# Patient Record
Sex: MALE | Race: WHITE | NOT HISPANIC OR LATINO | Employment: OTHER | URBAN - METROPOLITAN AREA
[De-identification: names, ages, dates, MRNs, and addresses within clinical notes are randomized per-mention and may not be internally consistent; named-entity substitution may affect disease eponyms.]

---

## 2017-02-17 ENCOUNTER — TRANSCRIBE ORDERS (OUTPATIENT)
Dept: DIABETES SERVICES | Facility: HOSPITAL | Age: 67
End: 2017-02-17

## 2017-02-17 DIAGNOSIS — E11.9 TYPE 2 DIABETES MELLITUS WITHOUT COMPLICATION, WITHOUT LONG-TERM CURRENT USE OF INSULIN (HCC): Primary | ICD-10-CM

## 2017-03-01 ENCOUNTER — APPOINTMENT (OUTPATIENT)
Dept: DIABETES SERVICES | Facility: HOSPITAL | Age: 67
End: 2017-03-01

## 2017-03-01 PROCEDURE — G0109 DIAB MANAGE TRN IND/GROUP: HCPCS

## 2017-03-08 ENCOUNTER — APPOINTMENT (OUTPATIENT)
Dept: DIABETES SERVICES | Facility: HOSPITAL | Age: 67
End: 2017-03-08

## 2017-03-08 PROCEDURE — G0109 DIAB MANAGE TRN IND/GROUP: HCPCS

## 2017-03-15 ENCOUNTER — APPOINTMENT (OUTPATIENT)
Dept: DIABETES SERVICES | Facility: HOSPITAL | Age: 67
End: 2017-03-15

## 2017-03-15 PROCEDURE — G0109 DIAB MANAGE TRN IND/GROUP: HCPCS

## 2017-08-07 ENCOUNTER — TRANSCRIBE ORDERS (OUTPATIENT)
Dept: CARDIOLOGY | Facility: CLINIC | Age: 67
End: 2017-08-07

## 2017-08-07 DIAGNOSIS — R06.09 DYSPNEA ON EXERTION: Primary | ICD-10-CM

## 2017-08-17 ENCOUNTER — HOSPITAL ENCOUNTER (OUTPATIENT)
Dept: CARDIOLOGY | Facility: HOSPITAL | Age: 67
Discharge: HOME | End: 2017-08-17
Attending: FAMILY MEDICINE | Admitting: FAMILY MEDICINE

## 2017-08-17 VITALS
BODY MASS INDEX: 28.04 KG/M2 | DIASTOLIC BLOOD PRESSURE: 68 MMHG | WEIGHT: 207 LBS | SYSTOLIC BLOOD PRESSURE: 112 MMHG | HEART RATE: 70 BPM | HEIGHT: 72 IN

## 2017-08-17 DIAGNOSIS — R06.09 DYSPNEA ON EXERTION: ICD-10-CM

## 2017-08-17 LAB
BH CV ECHO MEAS - ACS: 2.3 CM
BH CV ECHO MEAS - AO MAX PG: 6.2 MMHG
BH CV ECHO MEAS - AO ROOT AREA (BSA CORRECTED): 1.6
BH CV ECHO MEAS - AO ROOT AREA: 9.4 CM^2
BH CV ECHO MEAS - AO ROOT DIAM: 3.5 CM
BH CV ECHO MEAS - AO V2 MAX: 124.9 CM/SEC
BH CV ECHO MEAS - BSA(HAYCOCK): 2.2 M^2
BH CV ECHO MEAS - BSA: 2.2 M^2
BH CV ECHO MEAS - BZI_BMI: 28.1 KILOGRAMS/M^2
BH CV ECHO MEAS - BZI_METRIC_HEIGHT: 182.9 CM
BH CV ECHO MEAS - BZI_METRIC_WEIGHT: 93.9 KG
BH CV ECHO MEAS - CONTRAST EF 4CH: 78.1 ML/M^2
BH CV ECHO MEAS - EDV(MOD-SP4): 73 ML
BH CV ECHO MEAS - EDV(TEICH): 140.9 ML
BH CV ECHO MEAS - EF(CUBED): 84.4 %
BH CV ECHO MEAS - EF(MOD-SP4): 63 %
BH CV ECHO MEAS - EF(TEICH): 77.1 %
BH CV ECHO MEAS - ESV(MOD-SP4): 16 ML
BH CV ECHO MEAS - ESV(TEICH): 32.3 ML
BH CV ECHO MEAS - FS: 46.2 %
BH CV ECHO MEAS - IVS/LVPW: 1.1
BH CV ECHO MEAS - IVSD: 1.2 CM
BH CV ECHO MEAS - LAT PEAK E' VEL: 8 CM/SEC
BH CV ECHO MEAS - LV DIASTOLIC VOL/BSA (35-75): 33.8 ML/M^2
BH CV ECHO MEAS - LV MASS(C)D: 236.1 GRAMS
BH CV ECHO MEAS - LV MASS(C)DI: 109.2 GRAMS/M^2
BH CV ECHO MEAS - LV SYSTOLIC VOL/BSA (12-30): 7.4 ML/M^2
BH CV ECHO MEAS - LVIDD: 5.4 CM
BH CV ECHO MEAS - LVIDS: 2.9 CM
BH CV ECHO MEAS - LVLD AP4: 7.8 CM
BH CV ECHO MEAS - LVLS AP4: 5.9 CM
BH CV ECHO MEAS - LVPWD: 1.1 CM
BH CV ECHO MEAS - MED PEAK E' VEL: 8 CM/SEC
BH CV ECHO MEAS - MV A DUR: 0.13 SEC
BH CV ECHO MEAS - MV A MAX VEL: 111.1 CM/SEC
BH CV ECHO MEAS - MV DEC SLOPE: 316.6 CM/SEC^2
BH CV ECHO MEAS - MV DEC TIME: 0.24 SEC
BH CV ECHO MEAS - MV E MAX VEL: 78.1 CM/SEC
BH CV ECHO MEAS - MV E/A: 0.7
BH CV ECHO MEAS - MV P1/2T MAX VEL: 76.6 CM/SEC
BH CV ECHO MEAS - MV P1/2T: 70.9 MSEC
BH CV ECHO MEAS - MVA P1/2T LCG: 2.9 CM^2
BH CV ECHO MEAS - MVA(P1/2T): 3.1 CM^2
BH CV ECHO MEAS - PULM A REVS DUR: 0.11 SEC
BH CV ECHO MEAS - PULM A REVS VEL: 29.2 CM/SEC
BH CV ECHO MEAS - PULM DIAS VEL: 31.1 CM/SEC
BH CV ECHO MEAS - PULM S/D: 1.8
BH CV ECHO MEAS - PULM SYS VEL: 55.4 CM/SEC
BH CV ECHO MEAS - SI(CUBED): 61.2 ML/M^2
BH CV ECHO MEAS - SI(MOD-SP4): 26.4 ML/M^2
BH CV ECHO MEAS - SI(TEICH): 50.2 ML/M^2
BH CV ECHO MEAS - SV(CUBED): 132.4 ML
BH CV ECHO MEAS - SV(MOD-SP4): 57 ML
BH CV ECHO MEAS - SV(TEICH): 108.6 ML
BH CV ECHO MEAS - TAPSE (>1.6): 2.5 CM2
BH CV STRESS BP STAGE 1: NORMAL
BH CV STRESS BP STAGE 2: NORMAL
BH CV STRESS DURATION MIN STAGE 1: 3
BH CV STRESS DURATION MIN STAGE 2: 3
BH CV STRESS DURATION MIN STAGE 3: 0
BH CV STRESS DURATION SEC STAGE 1: 0
BH CV STRESS DURATION SEC STAGE 2: 0
BH CV STRESS DURATION SEC STAGE 3: 30
BH CV STRESS ECHO POST STRESS EJECTION FRACTION EF: 77 %
BH CV STRESS GRADE STAGE 1: 10
BH CV STRESS GRADE STAGE 2: 12
BH CV STRESS GRADE STAGE 3: 14
BH CV STRESS HR STAGE 1: 107
BH CV STRESS HR STAGE 2: 126
BH CV STRESS HR STAGE 3: 141
BH CV STRESS METS STAGE 1: 5
BH CV STRESS METS STAGE 2: 7.5
BH CV STRESS METS STAGE 3: 10
BH CV STRESS PROTOCOL 1: NORMAL
BH CV STRESS SPEED STAGE 1: 1.7
BH CV STRESS SPEED STAGE 2: 2.5
BH CV STRESS SPEED STAGE 3: 3.4
BH CV STRESS STAGE 1: 1
BH CV STRESS STAGE 2: 2
BH CV STRESS STAGE 3: 3
BH CV XLRA - RV BASE: 3.6 CM
BH CV XLRA - TDI S': 10 CM/SEC
E/E' RATIO: 9
LEFT ATRIUM VOLUME INDEX: 23 ML/M2
LV EF 2D ECHO EST: 63 %
MAXIMAL PREDICTED HEART RATE: 153 BPM
PERCENT MAX PREDICTED HR: 92.16 %
STRESS BASELINE BP: NORMAL MMHG
STRESS BASELINE HR: 70 BPM
STRESS PERCENT HR: 108 %
STRESS POST ESTIMATED WORKLOAD: 7.5 METS
STRESS POST EXERCISE DUR MIN: 6 MIN
STRESS POST EXERCISE DUR SEC: 30 SEC
STRESS POST PEAK BP: NORMAL MMHG
STRESS POST PEAK HR: 141 BPM
STRESS TARGET HR: 130 BPM

## 2017-08-17 PROCEDURE — 93350 STRESS TTE ONLY: CPT | Performed by: INTERNAL MEDICINE

## 2017-08-17 PROCEDURE — 93325 DOPPLER ECHO COLOR FLOW MAPG: CPT

## 2017-08-17 PROCEDURE — 93350 STRESS TTE ONLY: CPT

## 2017-08-17 PROCEDURE — 93018 CV STRESS TEST I&R ONLY: CPT | Performed by: INTERNAL MEDICINE

## 2017-08-17 PROCEDURE — 93017 CV STRESS TEST TRACING ONLY: CPT

## 2017-08-17 PROCEDURE — 93320 DOPPLER ECHO COMPLETE: CPT

## 2017-08-17 PROCEDURE — 93325 DOPPLER ECHO COLOR FLOW MAPG: CPT | Performed by: INTERNAL MEDICINE

## 2017-08-17 PROCEDURE — 93016 CV STRESS TEST SUPVJ ONLY: CPT | Performed by: INTERNAL MEDICINE

## 2017-08-17 PROCEDURE — 93320 DOPPLER ECHO COMPLETE: CPT | Performed by: INTERNAL MEDICINE

## 2021-03-01 ENCOUNTER — TRANSCRIBE ORDERS (OUTPATIENT)
Dept: ADMINISTRATIVE | Facility: HOSPITAL | Age: 71
End: 2021-03-01

## 2021-03-01 ENCOUNTER — HOSPITAL ENCOUNTER (OUTPATIENT)
Dept: CT IMAGING | Facility: HOSPITAL | Age: 71
Discharge: HOME | End: 2021-03-01
Admitting: FAMILY MEDICINE

## 2021-03-01 DIAGNOSIS — R07.9 CHEST PAIN, UNSPECIFIED TYPE: ICD-10-CM

## 2021-03-01 DIAGNOSIS — R06.00 DYSPNEA, UNSPECIFIED TYPE: ICD-10-CM

## 2021-03-01 DIAGNOSIS — R07.9 CHEST PAIN, UNSPECIFIED TYPE: Primary | ICD-10-CM

## 2021-03-01 LAB — CREAT BLDA-MCNC: 1 MG/DL (ref 0.6–1.3)

## 2021-03-01 PROCEDURE — 82565 ASSAY OF CREATININE: CPT

## 2021-03-01 PROCEDURE — 71275 CT ANGIOGRAPHY CHEST: CPT

## 2021-03-01 PROCEDURE — 0 IOPAMIDOL PER 1 ML: Performed by: FAMILY MEDICINE

## 2021-03-01 RX ADMIN — IOPAMIDOL 95 ML: 755 INJECTION, SOLUTION INTRAVENOUS at 12:28

## 2021-03-23 ENCOUNTER — OFFICE VISIT (OUTPATIENT)
Dept: SURGERY | Facility: CLINIC | Age: 71
End: 2021-03-23

## 2021-03-23 VITALS
DIASTOLIC BLOOD PRESSURE: 64 MMHG | HEIGHT: 72 IN | WEIGHT: 204.5 LBS | BODY MASS INDEX: 27.7 KG/M2 | OXYGEN SATURATION: 98 % | HEART RATE: 69 BPM | SYSTOLIC BLOOD PRESSURE: 112 MMHG

## 2021-03-23 DIAGNOSIS — K85.00 IDIOPATHIC ACUTE PANCREATITIS WITHOUT INFECTION OR NECROSIS: Primary | ICD-10-CM

## 2021-03-23 DIAGNOSIS — R93.3 ABNORMAL FINDINGS ON DIAGNOSTIC IMAGING OF OTHER PARTS OF DIGESTIVE TRACT: ICD-10-CM

## 2021-03-23 PROBLEM — K21.9 ACID REFLUX: Status: ACTIVE | Noted: 2021-03-23

## 2021-03-23 PROBLEM — K92.1 BLOOD IN FECES: Status: ACTIVE | Noted: 2021-03-23

## 2021-03-23 PROCEDURE — 99204 OFFICE O/P NEW MOD 45 MIN: CPT | Performed by: SURGERY

## 2021-03-23 RX ORDER — FENOFIBRATE 150 MG/1
CAPSULE ORAL
Status: ON HOLD | COMMUNITY
Start: 2014-11-26 | End: 2021-04-19

## 2021-03-23 RX ORDER — CALCIUM CARBONATE 200(500)MG
1 TABLET,CHEWABLE ORAL DAILY
Status: ON HOLD | COMMUNITY
End: 2021-04-19

## 2021-03-23 RX ORDER — LISINOPRIL AND HYDROCHLOROTHIAZIDE 20; 12.5 MG/1; MG/1
1 TABLET ORAL DAILY
COMMUNITY

## 2021-03-23 RX ORDER — HYDROCODONE BITARTRATE AND ACETAMINOPHEN 7.5; 325 MG/1; MG/1
TABLET ORAL
Status: ON HOLD | COMMUNITY
Start: 2021-03-01 | End: 2021-04-19

## 2021-03-23 NOTE — PROGRESS NOTES
Chief Complaint   Patient presents with   • Abdominal Pain     epi per pain an left side         Patient is a 71 y.o. male referred by Tenzin Roa MD for evaluation epigastric and left-sided pain.  Patient was diagnosed with pancreatitis.  Patient had an ultrasound of the gallbladder to evaluate for gallstones which was negative for gallstones and revealed a 2.9 density in the head of the pancreas.  Patient then underwent a CT scan with pancreatic cuts.  There was no evidence of a pancreatic mass but the patient had lymphadenopathy in the periportal triangle that was worrisome for metastatic versus lymphoma.  The radiologist recommended the patient undergo PET scan for further evaluation lymphadenopathy.  Patient reports he has had any weight loss approximately 10 pounds over the last month.  Patient reports he is having pain especially when trying to eat.  Patient describes the pain as a dull aching sensation that radiates from the epigastric area to the left side.  Patient denies nausea or vomiting fever or chills.  Patient denies jaundice, bradford colored stools or dark urine.  Patient is a smoker but has never been diagnosed with any cancers.  Patient has significant family history of lung cancer and colon cancer.  Patient has had a CT scan of the chest which was negative.  Patient denies any melena, hematochezia or bright red blood per rectum.  Patient denies any family history of ulcerative colitis, Crohn's disease or familial polyposis.  Patient had a colonoscopy in 2014 without evidence of polyps.    Past Medical History:   Diagnosis Date   • Hypertension      Past Surgical History:   Procedure Laterality Date   • COLONOSCOPY     • HIATAL HERNIA REPAIR       Family History   Problem Relation Age of Onset   • Colon cancer Mother    • Lung cancer Mother    • Lung cancer Father    • Lung cancer Brother      Social History     Tobacco Use   • Smoking status: Current Every Day Smoker     Packs/day: 0.50      "Years: 58.00     Pack years: 29.00     Types: Cigarettes     Start date: 1963   • Smokeless tobacco: Never Used   • Tobacco comment: trying   Substance Use Topics   • Alcohol use: Yes   • Drug use: Never     No Known Allergies    Current Outpatient Medications:   •  calcium carbonate (TUMS) 500 MG chewable tablet, Chew 1 tablet Daily., Disp: , Rfl:   •  Fenofibrate (LIPOFEN) 150 MG capsule, Take  by mouth., Disp: , Rfl:   •  Multiple Vitamins-Minerals (CENTAMIN PO), Take  by mouth., Disp: , Rfl:   •  HYDROcodone-acetaminophen (NORCO) 7.5-325 MG per tablet, , Disp: , Rfl:   •  lisinopril-hydrochlorothiazide (PRINZIDE,ZESTORETIC) 20-12.5 MG per tablet, Take 1 tablet by mouth Daily., Disp: , Rfl:     Review of Systems   Constitutional: Negative for activity change, chills, fever and unexpected weight change.   HENT: Negative for congestion.    Eyes: Negative for visual disturbance.   Respiratory: Negative for shortness of breath.    Cardiovascular: Positive for chest pain. Negative for palpitations.   Gastrointestinal: Positive for abdominal pain and constipation. Negative for blood in stool.   Endocrine: Negative for cold intolerance and heat intolerance.   Genitourinary: Negative for hematuria.   Musculoskeletal: Negative for gait problem.   Skin: Negative for color change.   Allergic/Immunologic: Negative for immunocompromised state.   Neurological: Negative for weakness and light-headedness.   Hematological: Negative for adenopathy.   Psychiatric/Behavioral: Negative for sleep disturbance. The patient is not nervous/anxious.           Vitals:    03/23/21 0849   BP: 112/64   BP Location: Left arm   Patient Position: Sitting   Cuff Size: Adult   Pulse: 69   SpO2: 98%   Weight: 92.8 kg (204 lb 8 oz)   Height: 182.9 cm (72\")       Physical Exam  General/physcological:   Alert and oriented x3, in no acute distress  HEENT: Normal cephalic, atraumatic, PERRLA, EOMI, sclera anicteric, moist mucous membranes, neck is " supple, no JVD, no carotid bruits, no thyromegaly no adenopathy  Respiratory: CTA and percussion  CVA: RRR, normal S1-S2, no murmurs, no gallops or rubs  GI: Positive BS, soft, nondistended, nontender, no rebound, no guarding, no hernias, no organomegaly and no masses  Musculoskeletal: Moves all 4 ext, no clubbing, no cyanosis or edema  Neurovascular: Grossly intact  Debilities: none  Emotional behavior: appropriate     Patient does use tobacco products currently.  I have advised patient to stop using tobacco products.  I have reviewed the ultrasound report that revealed a 2.9 cm mass in the pancreas, I have reviewed the CT scan that reveals no pancreatic mass but lymphadenopathy.    Assessment:  Epigastric abdominal pain  Pancreatitis  Lymphadenopathy seen on CT scan    Plan:  I have recommended that the patient undergo further evaluation with an EGD and a colonoscopy.  I have also advised patient I will schedule him a PET scan.  I have discussed the EGD and colonoscopy in detail with the patient.  I have discussed the risks, benefits and alternatives.  I have discussed the risk of anesthesia, bleeding and perforation.  Patient understands these risks, benefits and alternatives and wishes to proceed.  I have him scheduled at his earliest convenience.  I have the PET scan scheduled for 3/26/2021.    Anitha Restrepo MD  General, Minimally Invasive and Endoscopic Surgery  [facility] [place] [id number] [facility] [address] [place] [address]   [address]    [address]  [place], [place] [id number] [place] [id number]    P: [phone]  F: [phone]    Cc:  Tenzin Roa MD

## 2021-03-26 ENCOUNTER — HOSPITAL ENCOUNTER (OUTPATIENT)
Dept: PET IMAGING | Facility: HOSPITAL | Age: 71
Discharge: HOME | End: 2021-03-26
Admitting: SURGERY

## 2021-03-26 ENCOUNTER — HOSPITAL ENCOUNTER (OUTPATIENT)
Dept: PET IMAGING | Facility: HOSPITAL | Age: 71
End: 2021-03-26

## 2021-03-26 DIAGNOSIS — R93.3 ABNORMAL FINDINGS ON DIAGNOSTIC IMAGING OF OTHER PARTS OF DIGESTIVE TRACT: ICD-10-CM

## 2021-03-26 DIAGNOSIS — K85.00 IDIOPATHIC ACUTE PANCREATITIS WITHOUT INFECTION OR NECROSIS: ICD-10-CM

## 2021-03-26 LAB
GLUCOSE BLDC GLUCOMTR-MCNC: 350 MG/DL (ref 70–130)
GLUCOSE BLDC GLUCOMTR-MCNC: 351 MG/DL (ref 70–130)

## 2021-03-26 PROCEDURE — 82962 GLUCOSE BLOOD TEST: CPT

## 2021-04-06 ENCOUNTER — TRANSCRIBE ORDERS (OUTPATIENT)
Dept: LAB | Facility: SURGERY CENTER | Age: 71
End: 2021-04-06

## 2021-04-06 DIAGNOSIS — Z01.818 OTHER SPECIFIED PRE-OPERATIVE EXAMINATION: Primary | ICD-10-CM

## 2021-04-06 PROBLEM — K85.00 IDIOPATHIC ACUTE PANCREATITIS WITHOUT INFECTION OR NECROSIS: Status: ACTIVE | Noted: 2021-04-06

## 2021-04-08 ENCOUNTER — TELEPHONE (OUTPATIENT)
Dept: SURGERY | Facility: CLINIC | Age: 71
End: 2021-04-08

## 2021-04-08 NOTE — TELEPHONE ENCOUNTER
Contacted pt to inform that Dr. Restrepo  Would check on pt sugars. To see where he is.  Could not do PET scan because sugars were 320.  Need to reschedule scan but cannot until sugars are lower.  Pt is aware and PAT at [place] will do day of EGD & C scope 4-19-21

## 2021-04-16 ENCOUNTER — LAB (OUTPATIENT)
Dept: LAB | Facility: SURGERY CENTER | Age: 71
End: 2021-04-16

## 2021-04-16 DIAGNOSIS — Z01.818 OTHER SPECIFIED PRE-OPERATIVE EXAMINATION: ICD-10-CM

## 2021-04-16 LAB — SARS-COV-2 ORF1AB RESP QL NAA+PROBE: NOT DETECTED

## 2021-04-16 PROCEDURE — U0004 COV-19 TEST NON-CDC HGH THRU: HCPCS | Performed by: SURGERY

## 2021-04-16 PROCEDURE — U0005 INFEC AGEN DETEC AMPLI PROBE: HCPCS | Performed by: SURGERY

## 2021-04-16 PROCEDURE — C9803 HOPD COVID-19 SPEC COLLECT: HCPCS

## 2021-04-19 ENCOUNTER — HOSPITAL ENCOUNTER (OUTPATIENT)
Facility: SURGERY CENTER | Age: 71
Setting detail: HOSPITAL OUTPATIENT SURGERY
Discharge: HOME | End: 2021-04-19
Attending: SURGERY | Admitting: SURGERY

## 2021-04-19 ENCOUNTER — ANESTHESIA (OUTPATIENT)
Dept: SURGERY | Facility: SURGERY CENTER | Age: 71
End: 2021-04-19

## 2021-04-19 ENCOUNTER — ANESTHESIA EVENT (OUTPATIENT)
Dept: SURGERY | Facility: SURGERY CENTER | Age: 71
End: 2021-04-19

## 2021-04-19 VITALS
DIASTOLIC BLOOD PRESSURE: 61 MMHG | SYSTOLIC BLOOD PRESSURE: 106 MMHG | HEIGHT: 72 IN | OXYGEN SATURATION: 100 % | TEMPERATURE: 97.7 F | WEIGHT: 192.8 LBS | HEART RATE: 70 BPM | RESPIRATION RATE: 16 BRPM | BODY MASS INDEX: 26.11 KG/M2

## 2021-04-19 DIAGNOSIS — K85.00 IDIOPATHIC ACUTE PANCREATITIS WITHOUT INFECTION OR NECROSIS: ICD-10-CM

## 2021-04-19 LAB — GLUCOSE BLDC GLUCOMTR-MCNC: 159 MG/DL (ref 70–130)

## 2021-04-19 PROCEDURE — 45378 DIAGNOSTIC COLONOSCOPY: CPT | Performed by: SURGERY

## 2021-04-19 PROCEDURE — 0DB68ZX EXCISION OF STOMACH, VIA NATURAL OR ARTIFICIAL OPENING ENDOSCOPIC, DIAGNOSTIC: ICD-10-PCS | Performed by: SURGERY

## 2021-04-19 PROCEDURE — 88305 TISSUE EXAM BY PATHOLOGIST: CPT | Performed by: SURGERY

## 2021-04-19 PROCEDURE — 43239 EGD BIOPSY SINGLE/MULTIPLE: CPT | Performed by: SURGERY

## 2021-04-19 PROCEDURE — 0DB58ZX EXCISION OF ESOPHAGUS, VIA NATURAL OR ARTIFICIAL OPENING ENDOSCOPIC, DIAGNOSTIC: ICD-10-PCS | Performed by: SURGERY

## 2021-04-19 PROCEDURE — 25010000002 PROPOFOL 10 MG/ML EMULSION: Performed by: ANESTHESIOLOGY

## 2021-04-19 RX ORDER — LIDOCAINE HYDROCHLORIDE 20 MG/ML
INJECTION, SOLUTION INFILTRATION; PERINEURAL AS NEEDED
Status: DISCONTINUED | OUTPATIENT
Start: 2021-04-19 | End: 2021-04-19 | Stop reason: SURG

## 2021-04-19 RX ORDER — SODIUM CHLORIDE 0.9 % (FLUSH) 0.9 %
10 SYRINGE (ML) INJECTION AS NEEDED
Status: DISCONTINUED | OUTPATIENT
Start: 2021-04-19 | End: 2021-04-19 | Stop reason: HOSPADM

## 2021-04-19 RX ORDER — PROPOFOL 10 MG/ML
VIAL (ML) INTRAVENOUS AS NEEDED
Status: DISCONTINUED | OUTPATIENT
Start: 2021-04-19 | End: 2021-04-19 | Stop reason: SURG

## 2021-04-19 RX ORDER — SODIUM CHLORIDE, SODIUM LACTATE, POTASSIUM CHLORIDE, CALCIUM CHLORIDE 600; 310; 30; 20 MG/100ML; MG/100ML; MG/100ML; MG/100ML
1000 INJECTION, SOLUTION INTRAVENOUS CONTINUOUS
Status: DISCONTINUED | OUTPATIENT
Start: 2021-04-19 | End: 2021-04-19 | Stop reason: HOSPADM

## 2021-04-19 RX ORDER — LIDOCAINE HYDROCHLORIDE 10 MG/ML
0.5 INJECTION, SOLUTION INFILTRATION; PERINEURAL ONCE AS NEEDED
Status: DISCONTINUED | OUTPATIENT
Start: 2021-04-19 | End: 2021-04-19 | Stop reason: HOSPADM

## 2021-04-19 RX ORDER — MAGNESIUM HYDROXIDE 1200 MG/15ML
LIQUID ORAL AS NEEDED
Status: DISCONTINUED | OUTPATIENT
Start: 2021-04-19 | End: 2021-04-19 | Stop reason: HOSPADM

## 2021-04-19 RX ADMIN — PROPOFOL INJECTABLE EMULSION 200 MCG/KG/MIN: 10 INJECTION, EMULSION INTRAVENOUS at 11:55

## 2021-04-19 RX ADMIN — LIDOCAINE HYDROCHLORIDE 60 MG: 20 INJECTION, SOLUTION INFILTRATION; PERINEURAL at 11:54

## 2021-04-19 RX ADMIN — SODIUM CHLORIDE, POTASSIUM CHLORIDE, SODIUM LACTATE AND CALCIUM CHLORIDE 1000 ML: 600; 310; 30; 20 INJECTION, SOLUTION INTRAVENOUS at 11:23

## 2021-04-19 NOTE — OP NOTE
Operative Note:    Pre-op Diagnosis:   Abdominal pain     Post-op Diagnosis:  Esophagitis and Gastritis     Procedure:  EGD with cold forceps biopsies       Surgeon:  Anitha Restrepo M.D.    Anesthesia:  MAC    EBL:  Minium    Specimen:  GE/Antral Bxs    Complications:  None    Findings:  Esophagitis, Gastritis     Indications:  This is a pleasant 71 y.o. male  that presented with abdominal pain and pancreatitis.    Procedure:     EGD:   After a bite block was placed orally, the patient was sedated and positioned in left lateral decubitus position. The scope was introduced into the posterior pharynx under direct visualization which appeared normal.  Esophagus was intubated under direct visualization and the scope was advanced to the distal GE junction.  There was no abnormalities in the upper or mid esophagus.  The lower esophageal GE junction Z- line was irregular.  Multiple random biopsies were obtained with the cold biopsy forceps. Stomach was entered and insufflated.  The greater and lesser curvature appeared normal.  Scope was advanced into the antrum where the patient had striated gastritis.  Multiple random biopsies were obtained with the cold biopsy forceps and submitted to pathology.  Scope was advanced through the pylorus and into the duodenum to the third portion which all appeared normal.  Scope was slowly withdrawn and retroflexed without evidence of a HH. There were no new findings upon the withdrawal of the scope. The stomach was desufflated.  The procedure was terminated and the patient was transferred to recovery room in stable condition.    Assessment/Plan:  Esophagitis/Gastritis, await pathology results for treatment plan.  I will have the patient call my office on Wednesday for the pathology results.    Anitha Restrepo MD  General, Minimally Invasive and Endoscopic Surgery  [facility]    [address]   [address]    [address]  [place] [address]                [place] [address]    P: [phone]  F: [phone]    Cc:  Tenzin Roa MD

## 2021-04-19 NOTE — ANESTHESIA POSTPROCEDURE EVALUATION
"Patient: Venu Thomas    Procedure Summary     Date: 04/19/21 Room / Location: [facility] OR [facility] / [facility]    Anesthesia Start: 1150 Anesthesia Stop: 1238    Procedures:       COLONOSCOPY (N/A )      ESOPHAGOGASTRODUODENOSCOPY (N/A ) Diagnosis:       Idiopathic acute pancreatitis without infection or necrosis      (Idiopathic acute pancreatitis without infection or necrosis [K85.00])    Surgeons: Anitha Restrepo MD Provider: Isai Gamino MD    Anesthesia Type: MAC ASA Status: 3          Anesthesia Type: MAC    Vitals  Vitals Value Taken Time   /61 04/19/21 1257   Temp 36.5 °C (97.7 °F) 04/19/21 1237   Pulse 70 04/19/21 1257   Resp 16 04/19/21 1257   SpO2 100 % 04/19/21 1257           Post Anesthesia Care and Evaluation    Level of consciousness: awake and alert  Pain management: adequate  Anesthetic complications: No anesthetic complications    Cardiovascular status: acceptable  Respiratory status: acceptable  Hydration status: acceptable    Comments: /61 (BP Location: Left arm, Patient Position: Sitting)   Pulse 70   Temp 36.5 °C (97.7 °F) (Infrared)   Resp 16   Ht 182.9 cm (72\")   Wt 87.5 kg (192 lb 12.8 oz)   SpO2 100%   BMI 26.15 kg/m²         "

## 2021-04-19 NOTE — ANESTHESIA PREPROCEDURE EVALUATION
Anesthesia Evaluation     Patient summary reviewed and Nursing notes reviewed   no history of anesthetic complications:  NPO Solid Status: > 6 hours  NPO Liquid Status: > 6 hours           Airway   Mallampati: II  TM distance: >3 FB  Neck ROM: full  no difficulty expected and No difficulty expected  Dental - normal exam     Pulmonary - normal exam    breath sounds clear to auscultation  (+) a smoker Current,   (-) rhonchi, decreased breath sounds, wheezes, rales, stridor  Cardiovascular - normal exam    NYHA Classification: I  Rhythm: regular  Rate: normal    (+) hypertension, hyperlipidemia,   (-) murmur, weak pulses, friction rub, systolic click, carotid bruits, JVD, peripheral edema      Neuro/Psych- negative ROS  GI/Hepatic/Renal/Endo    (+)  GERD, GI bleeding lower , diabetes mellitus type 2 poorly controlled,     Musculoskeletal (-) negative ROS    Abdominal  - normal exam    Abdomen: soft.   Substance History - negative use     OB/GYN negative ob/gyn ROS         Other - negative ROS                       Anesthesia Plan    ASA 3     MAC     intravenous induction     Anesthetic plan, all risks, benefits, and alternatives have been provided, discussed and informed consent has been obtained with: patient.

## 2021-04-19 NOTE — OP NOTE
Operative Note:    Pre-op dx: abdominal pain     Post-op dx: diverticulosis, hemorrhoids    Procedure: Colonoscopy    Surgeon: Anitha Restrepo MD    Anesthesia: MAC    EBL: none    Specimen:    Order Name Source Comment Collection Info Order Time   TISSUE PATHOLOGY EXAM Gastric, Antrum Antrum bx Collected By: Anitha Restrepo MD 4/19/2021 12:04 PM     Release to patient   Immediate            Complications: none    Procedure:    Colonoscopy:  A digital rectal examination was performed which revealed no rectal masses.  Patient had prolapsed hemorrhoids.  Scope was introduced into the rectum and advanced into the sigmoid, descending colon, splenic flexure, transverse colon, hepatic flexure, ascending colon and into the cecum.  Cecum was identified by the ileocecal valve and appendiceal orifice.  Patient had diverticulosis throughout the sigmoid colon.  There was no evidence of any polyps, masses, AV malformation or inflammation.  The bowel preparation was excellent. The scope was slowly withdrawn and a second look was performed.  Upon the second look, there were no new findings.  The colon was desufflated and the procedure was terminated.   Patient was transferred to recovery room in stable condition.      Assessment/Plan:  Repeat colonoscopy in 10 years.  Diverticulosis and hemorrhoids    Anitha Restrepo MD  General, Minimally Invasive and Endoscopic Surgery  [facility]    [address] [facility] [address] [place] [address]   [address]    [address]  [address] [address]    P: [phone]  F: [phone]    Cc:  Tenzin Roa MD

## 2021-04-19 NOTE — NURSING NOTE
Patient informed that they are high risk for sleep apnea and its clinical implications.  Suggested follow up with Primary Care Physician for possible sleep study.

## 2021-04-20 LAB
LAB AP CASE REPORT: NORMAL
LAB AP CLINICAL INFORMATION: NORMAL
LAB AP DIAGNOSIS COMMENT: NORMAL
PATH REPORT.FINAL DX SPEC: NORMAL
PATH REPORT.GROSS SPEC: NORMAL

## 2021-04-23 ENCOUNTER — HOSPITAL ENCOUNTER (OUTPATIENT)
Dept: PET IMAGING | Facility: HOSPITAL | Age: 71
Discharge: HOME | End: 2021-04-23

## 2021-04-23 ENCOUNTER — APPOINTMENT (OUTPATIENT)
Dept: OTHER | Facility: HOSPITAL | Age: 71
End: 2021-04-23

## 2021-04-23 DIAGNOSIS — Z09 FOLLOW UP: ICD-10-CM

## 2021-04-23 LAB — GLUCOSE BLDC GLUCOMTR-MCNC: 182 MG/DL (ref 70–130)

## 2021-04-23 PROCEDURE — 0 FLUDEOXYGLUCOSE F18 SOLUTION: Performed by: SURGERY

## 2021-04-23 PROCEDURE — A9552 F18 FDG: HCPCS | Performed by: SURGERY

## 2021-04-23 PROCEDURE — 78815 PET IMAGE W/CT SKULL-THIGH: CPT

## 2021-04-23 PROCEDURE — 82962 GLUCOSE BLOOD TEST: CPT

## 2021-04-23 RX ADMIN — FLUDEOXYGLUCOSE F18 1 DOSE: 300 INJECTION INTRAVENOUS at 07:47

## 2021-04-27 ENCOUNTER — TELEPHONE (OUTPATIENT)
Dept: SURGERY | Facility: SURGERY CENTER | Age: 71
End: 2021-04-27

## 2021-07-26 ENCOUNTER — TELEPHONE (OUTPATIENT)
Dept: SURGERY | Facility: CLINIC | Age: 71
End: 2021-07-26

## 2021-07-26 NOTE — TELEPHONE ENCOUNTER
Dr Roa called and is concerned about pt due to left upper quad pain and weight loss. Offered appointment 7/27/21 and pt will be coming in to see Dr Restrepo

## 2021-07-27 ENCOUNTER — OFFICE VISIT (OUTPATIENT)
Dept: SURGERY | Facility: CLINIC | Age: 71
End: 2021-07-27

## 2021-07-27 VITALS
DIASTOLIC BLOOD PRESSURE: 72 MMHG | OXYGEN SATURATION: 98 % | HEART RATE: 60 BPM | BODY MASS INDEX: 24.15 KG/M2 | WEIGHT: 178.3 LBS | TEMPERATURE: 97.4 F | SYSTOLIC BLOOD PRESSURE: 110 MMHG | RESPIRATION RATE: 18 BRPM | HEIGHT: 72 IN

## 2021-07-27 DIAGNOSIS — K85.00 IDIOPATHIC ACUTE PANCREATITIS WITHOUT INFECTION OR NECROSIS: ICD-10-CM

## 2021-07-27 DIAGNOSIS — R63.4 RECENT UNEXPLAINED WEIGHT LOSS: Primary | ICD-10-CM

## 2021-07-27 PROCEDURE — 99214 OFFICE O/P EST MOD 30 MIN: CPT | Performed by: SURGERY

## 2021-07-28 ENCOUNTER — HOSPITAL ENCOUNTER (OUTPATIENT)
Dept: CT IMAGING | Facility: HOSPITAL | Age: 71
Discharge: HOME | End: 2021-07-28
Admitting: SURGERY

## 2021-07-28 ENCOUNTER — TELEPHONE (OUTPATIENT)
Dept: SURGERY | Facility: CLINIC | Age: 71
End: 2021-07-28

## 2021-07-28 DIAGNOSIS — K85.00 IDIOPATHIC ACUTE PANCREATITIS WITHOUT INFECTION OR NECROSIS: ICD-10-CM

## 2021-07-28 DIAGNOSIS — R63.4 RECENT UNEXPLAINED WEIGHT LOSS: ICD-10-CM

## 2021-07-28 LAB — CREAT BLDA-MCNC: 1 MG/DL (ref 0.6–1.3)

## 2021-07-28 PROCEDURE — 74160 CT ABDOMEN W/CONTRAST: CPT

## 2021-07-28 PROCEDURE — 0 DIATRIZOATE MEGLUMINE & SODIUM PER 1 ML: Performed by: SURGERY

## 2021-07-28 PROCEDURE — 25010000002 IOPAMIDOL 61 % SOLUTION: Performed by: SURGERY

## 2021-07-28 PROCEDURE — 82565 ASSAY OF CREATININE: CPT

## 2021-07-28 RX ADMIN — DIATRIZOATE MEGLUMINE AND DIATRIZOATE SODIUM 30 ML: 660; 100 LIQUID ORAL; RECTAL at 11:30

## 2021-07-28 RX ADMIN — IOPAMIDOL 95 ML: 612 INJECTION, SOLUTION INTRAVENOUS at 12:13

## 2021-07-28 NOTE — TELEPHONE ENCOUNTER
I have discussed the pancreatic mass with the patient and Dr. Roa. I have a call out to Dr. Cárdenas for a possible Whipple.

## (undated) DEVICE — CANN NASL CO2 TRULINK W/O2 A/

## (undated) DEVICE — SINGLE-USE BIOPSY FORCEPS: Brand: RADIAL JAW 4

## (undated) DEVICE — BITEBLOCK OMNI BLOC

## (undated) DEVICE — GOWN ISOL W/THUMB UNIV BLU BX/15

## (undated) DEVICE — KT ORCA ORCAPOD DISP STRL

## (undated) DEVICE — Device

## (undated) DEVICE — VIAL FORMLN CAP 10PCT 20ML